# Patient Record
Sex: MALE | Race: BLACK OR AFRICAN AMERICAN | NOT HISPANIC OR LATINO | Employment: UNEMPLOYED | URBAN - METROPOLITAN AREA
[De-identification: names, ages, dates, MRNs, and addresses within clinical notes are randomized per-mention and may not be internally consistent; named-entity substitution may affect disease eponyms.]

---

## 2018-01-18 ENCOUNTER — HOSPITAL ENCOUNTER (EMERGENCY)
Facility: HOSPITAL | Age: 68
Discharge: HOME | End: 2018-01-18
Attending: EMERGENCY MEDICINE
Payer: MEDICARE

## 2018-01-18 VITALS
DIASTOLIC BLOOD PRESSURE: 94 MMHG | HEART RATE: 70 BPM | BODY MASS INDEX: 30.48 KG/M2 | RESPIRATION RATE: 18 BRPM | WEIGHT: 225 LBS | TEMPERATURE: 98 F | HEIGHT: 72 IN | OXYGEN SATURATION: 98 % | SYSTOLIC BLOOD PRESSURE: 186 MMHG

## 2018-01-18 DIAGNOSIS — R04.0 EPISTAXIS: Primary | ICD-10-CM

## 2018-01-18 DIAGNOSIS — Z79.01 CHRONIC ANTICOAGULATION: ICD-10-CM

## 2018-01-18 LAB
ALBUMIN SERPL BCP-MCNC: 3.6 G/DL
ALP SERPL-CCNC: 82 U/L
ALT SERPL W/O P-5'-P-CCNC: 12 U/L
ANION GAP SERPL CALC-SCNC: 11 MMOL/L
APTT BLDCRRT: 39.8 SEC
AST SERPL-CCNC: 12 U/L
BASOPHILS # BLD AUTO: 0.06 K/UL
BASOPHILS NFR BLD: 0.8 %
BILIRUB SERPL-MCNC: 0.7 MG/DL
BUN SERPL-MCNC: 23 MG/DL
CALCIUM SERPL-MCNC: 9.2 MG/DL
CHLORIDE SERPL-SCNC: 110 MMOL/L
CO2 SERPL-SCNC: 19 MMOL/L
CREAT SERPL-MCNC: 1.5 MG/DL
DIFFERENTIAL METHOD: ABNORMAL
EOSINOPHIL # BLD AUTO: 0.1 K/UL
EOSINOPHIL NFR BLD: 1.9 %
ERYTHROCYTE [DISTWIDTH] IN BLOOD BY AUTOMATED COUNT: 14.4 %
EST. GFR  (AFRICAN AMERICAN): 56 ML/MIN/1.73 M^2
EST. GFR  (NON AFRICAN AMERICAN): 48 ML/MIN/1.73 M^2
GLUCOSE SERPL-MCNC: 114 MG/DL
HCT VFR BLD AUTO: 37.4 %
HGB BLD-MCNC: 12.9 G/DL
INR PPP: 1.2
LYMPHOCYTES # BLD AUTO: 1.6 K/UL
LYMPHOCYTES NFR BLD: 21.2 %
MCH RBC QN AUTO: 30.6 PG
MCHC RBC AUTO-ENTMCNC: 34.5 G/DL
MCV RBC AUTO: 89 FL
MONOCYTES # BLD AUTO: 0.6 K/UL
MONOCYTES NFR BLD: 7.9 %
NEUTROPHILS # BLD AUTO: 5.1 K/UL
NEUTROPHILS NFR BLD: 68.1 %
PLATELET # BLD AUTO: 207 K/UL
PMV BLD AUTO: 9.6 FL
POTASSIUM SERPL-SCNC: 4.5 MMOL/L
PROT SERPL-MCNC: 7.2 G/DL
PROTHROMBIN TIME: 12.7 SEC
RBC # BLD AUTO: 4.21 M/UL
SODIUM SERPL-SCNC: 140 MMOL/L
WBC # BLD AUTO: 7.51 K/UL

## 2018-01-18 PROCEDURE — 85025 COMPLETE CBC W/AUTO DIFF WBC: CPT

## 2018-01-18 PROCEDURE — 25000003 PHARM REV CODE 250: Performed by: EMERGENCY MEDICINE

## 2018-01-18 PROCEDURE — 99283 EMERGENCY DEPT VISIT LOW MDM: CPT

## 2018-01-18 PROCEDURE — 80053 COMPREHEN METABOLIC PANEL: CPT

## 2018-01-18 PROCEDURE — 85730 THROMBOPLASTIN TIME PARTIAL: CPT

## 2018-01-18 PROCEDURE — 85610 PROTHROMBIN TIME: CPT

## 2018-01-18 RX ORDER — AMIODARONE HYDROCHLORIDE 200 MG/1
TABLET ORAL DAILY
COMMUNITY

## 2018-01-18 RX ORDER — ROSUVASTATIN CALCIUM 5 MG/1
5 TABLET, COATED ORAL NIGHTLY
COMMUNITY

## 2018-01-18 RX ORDER — METOPROLOL SUCCINATE 50 MG/1
50 TABLET, EXTENDED RELEASE ORAL DAILY
COMMUNITY

## 2018-01-18 RX ORDER — ALLOPURINOL 100 MG/1
100 TABLET ORAL DAILY
COMMUNITY

## 2018-01-18 RX ORDER — LOSARTAN POTASSIUM 25 MG/1
25 TABLET ORAL DAILY
COMMUNITY

## 2018-01-18 RX ORDER — FINASTERIDE 5 MG/1
5 TABLET, FILM COATED ORAL DAILY
COMMUNITY

## 2018-01-18 RX ORDER — TAMSULOSIN HYDROCHLORIDE 0.4 MG/1
0.4 CAPSULE ORAL DAILY
COMMUNITY

## 2018-01-18 RX ADMIN — GELATIN ABSORBABLE SPONGE SIZE 100 1 APPLICATOR: MISC at 03:01

## 2018-01-18 NOTE — ED TRIAGE NOTES
Pt states that he has been having episodes of epistaxis x 5 days on and off. Today's episode started around 10 am and he was just sitting at the computer and watching tv. Pt states that this episode is by far the worst one and has not been able to get it to stop bleeding. Pt takes xarelto.

## 2018-01-18 NOTE — ED PROVIDER NOTES
Encounter Date: 1/18/2018    SCRIBE #1 NOTE: I, Fannie Waite, am scribing for, and in the presence of,  Dav Faulkner MD. I have scribed the following portions of the note - Other sections scribed: ROS, HPI and PE.       History     Chief Complaint   Patient presents with    Epistaxis     x 3 days; hx of HTN, afib, and on xerolto     CC: Epistaxis  HPI: This 67 y.o. male with a past medical history of Atrial fibrillation; Gout; Hyperlipidemia; Hypertension; and Prostate cancer and on Xarelto, presents to the ED complaining of epistaxis intermittently for last 4-5 days. Current episode began when was siting down using computer at 10 AM today. He notes this episode is severe than any of his  prior episodes. He was unable to stop the bleeding despite of applied pressure. Denies emesis, headache, dizziness and/or bleeding from any other orifices.       The history is provided by the patient. No  was used.     Review of patient's allergies indicates:  No Known Allergies  Past Medical History:   Diagnosis Date    Atrial fibrillation     Gout     Hyperlipidemia     Hypertension     Prostate cancer      Past Surgical History:   Procedure Laterality Date    PROSTATE SURGERY       No family history on file.  Social History   Substance Use Topics    Smoking status: Former Smoker    Smokeless tobacco: Never Used    Alcohol use Yes      Comment: occasionally     Review of Systems   Constitutional: Negative for chills and fever.   HENT: Positive for nosebleeds. Negative for rhinorrhea and sore throat.    Eyes: Negative for redness.   Respiratory: Negative for cough.    Cardiovascular: Negative for chest pain.   Gastrointestinal: Negative for abdominal pain, diarrhea, nausea and vomiting.   Genitourinary: Negative for dysuria.   Musculoskeletal: Negative for back pain.   Skin: Negative for color change.   Neurological: Negative for syncope and weakness.   Psychiatric/Behavioral: The patient is not  nervous/anxious.        Physical Exam     Initial Vitals [01/18/18 1331]   BP Pulse Resp Temp SpO2   (!) 212/100 96 20 98.2 °F (36.8 °C) 95 %      MAP       137.33         Physical Exam    Nursing note and vitals reviewed.  Constitutional: Vital signs are normal. He appears well-developed and well-nourished. He is active.  Non-toxic appearance. No distress.   HENT:   Head: Normocephalic and atraumatic.   Nose: Epistaxis (L nostril with oozing blood) is observed.   Eyes: EOM are normal.   Neck: Trachea normal. Neck supple.   Cardiovascular: Normal rate and regular rhythm.   Pulmonary/Chest: Breath sounds normal. No respiratory distress.   Abdominal: Soft. Normal appearance and bowel sounds are normal. He exhibits no distension. There is no tenderness.   Musculoskeletal: Normal range of motion. He exhibits no edema.   Neurological: He is alert.   Skin: Skin is warm, dry and intact.   Psychiatric: He has a normal mood and affect.         ED Course   Procedures  Labs Reviewed   CBC W/ AUTO DIFFERENTIAL - Abnormal; Notable for the following:        Result Value    RBC 4.21 (*)     Hemoglobin 12.9 (*)     Hematocrit 37.4 (*)     All other components within normal limits   COMPREHENSIVE METABOLIC PANEL - Abnormal; Notable for the following:     CO2 19 (*)     Glucose 114 (*)     Creatinine 1.5 (*)     eGFR if  56 (*)     eGFR if non  48 (*)     All other components within normal limits   PROTIME-INR - Abnormal; Notable for the following:     Prothrombin Time 12.7 (*)     All other components within normal limits   APTT - Abnormal; Notable for the following:     aPTT 39.8 (*)     All other components within normal limits             Medical Decision Making:   History:   Old Medical Records: I decided to obtain old medical records.  Clinical Tests:   Lab Tests: Ordered and Reviewed  ED Management:  This was an emergent evaluation.  The patient is a 67-year-old male on Xarelto who arrives  complaining of epistaxis.  He has been bleeding copiously.  He is able to stop bleeding with pressure sometimes within the bleeding begins again.  He had bilateral nasal packings that he apply himself when I first saw him.  There was some minor oozing coming from the left knee air.  I did feel like the bleeding was coming from a raw area on the inferior nasal turbinate.  Initially, I felt that the bleeding was controlled, but then the patient blew his nose very forcefully and the bleeding began again.  There was no significant hemorrhage but rather oozing.  I applied Gelfoam.  The patient's bleeding stopped completely.  He was advised to continue on with the Gelfoam in place and to continue on with his Xarelto.  He was hypertensive prior to discharge, but he was due for his metoprolol.  I allowed him to take the metoprolol prior to discharge.  There was no evidence of hypertensive emergency.  He did not have any chest pain or shortness of breath or headache.  He was discharged in stable condition to follow with his primary care doctor.            Scribe Attestation:   Scribe #1: I performed the above scribed service and the documentation accurately describes the services I performed. I attest to the accuracy of the note.    Attending Attestation:           Physician Attestation for Scribe:  Physician Attestation Statement for Scribe #1: I, Dav Faulkner MD, reviewed documentation, as scribed by Fannie Waite in my presence, and it is both accurate and complete.                 ED Course      Clinical Impression:   There were no encounter diagnoses.                           Kareem Faulkner MD  01/18/18 4488

## 2020-04-20 ENCOUNTER — HOSPITAL ENCOUNTER (EMERGENCY)
Facility: HOSPITAL | Age: 70
End: 2020-04-20
Attending: EMERGENCY MEDICINE
Payer: MEDICARE

## 2020-04-20 VITALS — DIASTOLIC BLOOD PRESSURE: 24 MMHG | SYSTOLIC BLOOD PRESSURE: 66 MMHG

## 2020-04-20 DIAGNOSIS — I46.9 CARDIAC ARREST: Primary | ICD-10-CM

## 2020-04-20 LAB — SARS-COV-2 RDRP RESP QL NAA+PROBE: NEGATIVE

## 2020-04-20 PROCEDURE — 99285 EMERGENCY DEPT VISIT HI MDM: CPT | Mod: 25

## 2020-04-20 PROCEDURE — 92950 HEART/LUNG RESUSCITATION CPR: CPT

## 2020-04-20 PROCEDURE — 25000003 PHARM REV CODE 250: Performed by: EMERGENCY MEDICINE

## 2020-04-20 PROCEDURE — U0002 COVID-19 LAB TEST NON-CDC: HCPCS

## 2020-04-20 PROCEDURE — 63600175 PHARM REV CODE 636 W HCPCS: Performed by: EMERGENCY MEDICINE

## 2020-04-20 RX ORDER — EPINEPHRINE 0.1 MG/ML
INJECTION INTRAVENOUS CODE/TRAUMA/SEDATION MEDICATION
Status: COMPLETED | OUTPATIENT
Start: 2020-04-20 | End: 2020-04-20

## 2020-04-20 RX ORDER — SODIUM BICARBONATE 1 MEQ/ML
SYRINGE (ML) INTRAVENOUS CODE/TRAUMA/SEDATION MEDICATION
Status: COMPLETED | OUTPATIENT
Start: 2020-04-20 | End: 2020-04-20

## 2020-04-20 RX ADMIN — SODIUM BICARBONATE 50 MEQ: 84 INJECTION, SOLUTION INTRAVENOUS at 01:04

## 2020-04-20 RX ADMIN — EPINEPHRINE 1 MG: 0.1 INJECTION, SOLUTION ENDOTRACHEAL; INTRACARDIAC; INTRAVENOUS at 01:04

## 2020-04-20 NOTE — ED NOTES
Pt presents to ED via EMS in cardiac arrest. Per wife, pt was c/o SOB and asked her to called 911. As Soon as EMS arrival, pt collapsed on the porch and was pulseless. EMS initiated CPR, pt in v fib and shocked a total of 5 times and given 5 epi.  James tube was placed by EMS. Pt arrives to ED with cpr in progress via rolly and bagging james tube. Prior to moving to ED stretcher, EMS noted pt to be in vfib  And another shock was given. Pt then moved to ED stretcher and CPR continued via Rolly.

## 2020-04-20 NOTE — ED PROVIDER NOTES
Encounter Date: 4/20/2020       History   No chief complaint on file.    Adult male presents via [facility] with CPR in progress. Patient activated 911 for respiratory distress. Per paramedic, he collapsed in front of them about 2 minutes later, and had immediate initiation of CPR at approximately 00:20. Patient had PEA initially, then vfib. He received a total of 5 epinephrine and 2 defibrillations prior to arrival here. EMS placed James airway.         Review of patient's allergies indicates:  No Known Allergies  Past Medical History:   Diagnosis Date    Atrial fibrillation     Gout     Hyperlipidemia     Hypertension     Prostate cancer      Past Surgical History:   Procedure Laterality Date    PROSTATE SURGERY       No family history on file.  Social History     Tobacco Use    Smoking status: Former Smoker    Smokeless tobacco: Never Used   Substance Use Topics    Alcohol use: Yes     Comment: occasionally    Drug use: Not on file     Review of Systems   Unable to perform ROS: Acuity of condition       Physical Exam     Initial Vitals   BP Pulse Resp Temp SpO2   04/20/20 0110 04/20/20 0105 -- -- --   (!) 66/24 (!) 0         MAP       --                Physical Exam    Nursing note and vitals reviewed.  Constitutional: Dandre Santos appears well-developed and well-nourished. Dandre Santos appears distressed.   Unresponsive adult male, morbidly obese, LMA in place, being bagged by EMS, with Rolly device providing chest compressions.   HENT:   Head: Normocephalic and atraumatic.   Profuse oropharyngeal frothy secretions.   Eyes:   Pupils 4mm and fixed bilaterally.   Neck: Normal range of motion. Neck supple.   Cardiovascular:   Femoral pulses only with Rolly.   Pulmonary/Chest:   Breath sounds on bagging. No spontaneous respirations.   Musculoskeletal: Dandre Santos exhibits edema (trace bilateral lower extremity edema).   Neurological:   Moving all extremities.   Skin: Skin is  warm and dry.   Psychiatric:   Unresponsive.         ED Course   ED US Echo  Date/Time: 2020 1:23 AM  Performed by: Lizzie Nesbitt MD  Authorized by: Lizzie Nesbitt MD   Comments: No cardiac activity.         Labs Reviewed   SARS-COV-2 RNA AMPLIFICATION, QUAL    Narrative:     What symptom criteria does the patient meet?->Difficulty  breathing          Imaging Results    None          Medical Decision Making:   History:   I obtained history from: EMS provider.  Initial Assessment:   Adult male presents via EMS with CPR in progress.  Differential Diagnosis:   Ddx includes ACS, PE, hypoglycemia, COVID-19, CLAU, electrolyte derangement, other.  ED Management:  Despite multiple rounds of CPR per ACLS protocol by EMS and by hospital personnel, patient never regained organized cardiac activity. His pupils are fixed and dilated. He has no cardiac activity on bedside ultrasound. Consequently I called time of death at 01:23am.     Family arrived later to ED and I informed them of patient's death.                                 Clinical Impression:       ICD-10-CM ICD-9-CM   1. Cardiac arrest I46.9 427.5             ED Disposition Condition                               Lizzie Nesbitt MD  20 [id number]

## 2020-04-20 NOTE — ED NOTES
[facility] Notified     Screened by Elizabeth Darnell   #[phone]      Rule out for [facility]  Possible tissue donation.

## 2020-04-20 NOTE — CODE/ RAPID DOCUMENTATION
Upon ED arrival, before transfer to ED stretcher EMS reported pt in Vfib and delivered shock. CPR continued via Rolly.